# Patient Record
Sex: FEMALE | Race: WHITE | NOT HISPANIC OR LATINO | Employment: OTHER | URBAN - METROPOLITAN AREA
[De-identification: names, ages, dates, MRNs, and addresses within clinical notes are randomized per-mention and may not be internally consistent; named-entity substitution may affect disease eponyms.]

---

## 2017-02-07 ENCOUNTER — TELEPHONE (OUTPATIENT)
Dept: FAMILY MEDICINE | Facility: CLINIC | Age: 62
End: 2017-02-07

## 2017-02-07 NOTE — TELEPHONE ENCOUNTER
Panel Management Review      Patient has the following on her problem list:     Hypertension   Last three blood pressure readings:  BP Readings from Last 3 Encounters:   09/20/16 156/90   09/13/16 164/80   09/13/16 152/87     Blood pressure: Failed    HTN Guidelines:  Age 18-59 BP range:  Less than 140/90  Age 60-85 with Diabetes:  Less than 140/90  Age 60-85 without Diabetes:  less than 150/90      Composite cancer screening  Chart review shows that this patient is due/due soon for the following None  Summary:    Patient is due/failing the following:   BP CHECK    Action needed:   Patient needs office visit for BP follow up with PCP.    Type of outreach:    Phone, spoke to patient.  appt scheduled    Questions for provider review:    None                                                                                                                                    Amber Goodson CMA       Chart routed to Care Team .

## 2017-03-01 ENCOUNTER — HOSPITAL ENCOUNTER (OUTPATIENT)
Dept: MAMMOGRAPHY | Facility: CLINIC | Age: 62
Discharge: HOME | End: 2017-03-01
Attending: OBSTETRICS & GYNECOLOGY | Admitting: OBSTETRICS & GYNECOLOGY
Payer: COMMERCIAL

## 2017-03-01 ENCOUNTER — OFFICE VISIT (OUTPATIENT)
Dept: FAMILY MEDICINE | Facility: CLINIC | Age: 62
End: 2017-03-01
Payer: COMMERCIAL

## 2017-03-01 VITALS
HEIGHT: 63 IN | SYSTOLIC BLOOD PRESSURE: 156 MMHG | DIASTOLIC BLOOD PRESSURE: 88 MMHG | TEMPERATURE: 98.8 F | BODY MASS INDEX: 32.5 KG/M2 | OXYGEN SATURATION: 98 % | HEART RATE: 89 BPM | WEIGHT: 183.4 LBS | RESPIRATION RATE: 20 BRPM

## 2017-03-01 DIAGNOSIS — L30.9 ECZEMA, UNSPECIFIED TYPE: ICD-10-CM

## 2017-03-01 DIAGNOSIS — Z12.31 VISIT FOR SCREENING MAMMOGRAM: ICD-10-CM

## 2017-03-01 DIAGNOSIS — L91.8 SKIN TAG: ICD-10-CM

## 2017-03-01 DIAGNOSIS — Z00.00 ROUTINE GENERAL MEDICAL EXAMINATION AT A HEALTH CARE FACILITY: Primary | ICD-10-CM

## 2017-03-01 DIAGNOSIS — Z23 NEED FOR PROPHYLACTIC VACCINATION AND INOCULATION AGAINST INFLUENZA: ICD-10-CM

## 2017-03-01 DIAGNOSIS — I10 BENIGN ESSENTIAL HYPERTENSION: ICD-10-CM

## 2017-03-01 LAB
CHOLEST SERPL-MCNC: 216 MG/DL
GLUCOSE SERPL-MCNC: 106 MG/DL (ref 70–99)
HCV AB SERPL QL IA: NORMAL
HDLC SERPL-MCNC: 58 MG/DL
LDLC SERPL CALC-MCNC: 127 MG/DL
NONHDLC SERPL-MCNC: 158 MG/DL
TRIGL SERPL-MCNC: 154 MG/DL

## 2017-03-01 PROCEDURE — 90471 IMMUNIZATION ADMIN: CPT | Performed by: FAMILY MEDICINE

## 2017-03-01 PROCEDURE — 99396 PREV VISIT EST AGE 40-64: CPT | Performed by: FAMILY MEDICINE

## 2017-03-01 PROCEDURE — 82947 ASSAY GLUCOSE BLOOD QUANT: CPT | Performed by: FAMILY MEDICINE

## 2017-03-01 PROCEDURE — 90686 IIV4 VACC NO PRSV 0.5 ML IM: CPT | Performed by: FAMILY MEDICINE

## 2017-03-01 PROCEDURE — 99213 OFFICE O/P EST LOW 20 MIN: CPT | Mod: 25 | Performed by: FAMILY MEDICINE

## 2017-03-01 PROCEDURE — 86803 HEPATITIS C AB TEST: CPT | Performed by: FAMILY MEDICINE

## 2017-03-01 PROCEDURE — G0202 SCR MAMMO BI INCL CAD: HCPCS

## 2017-03-01 PROCEDURE — 80061 LIPID PANEL: CPT | Performed by: FAMILY MEDICINE

## 2017-03-01 PROCEDURE — 36415 COLL VENOUS BLD VENIPUNCTURE: CPT | Performed by: FAMILY MEDICINE

## 2017-03-01 RX ORDER — TRIAMCINOLONE ACETONIDE 1 MG/G
OINTMENT TOPICAL
Qty: 80 G | Refills: 0 | Status: SHIPPED | OUTPATIENT
Start: 2017-03-01 | End: 2017-05-24

## 2017-03-01 ASSESSMENT — PAIN SCALES - GENERAL: PAINLEVEL: NO PAIN (0)

## 2017-03-01 NOTE — PROGRESS NOTES
Amber Please inform Julia/ or caretaker  that this result(s) is/are normal.  Thanks. SHEYLA Salinas MD

## 2017-03-01 NOTE — PROGRESS NOTES
Injectable Influenza Immunization Documentation    1.  Is the person to be vaccinated sick today?  No    2. Does the person to be vaccinated have an allergy to eggs or to a component of the vaccine?  No    3. Has the person to be vaccinated today ever had a serious reaction to influenza vaccine in the past?  No    4. Has the person to be vaccinated ever had Guillain-Huron syndrome?  No     Form completed by self  GABRIELE Lane

## 2017-03-01 NOTE — MR AVS SNAPSHOT
After Visit Summary   3/1/2017    Julia Jacobson    MRN: [id number]           Patient Information     Date Of Birth          1955        Visit Information        Provider Department      3/1/2017 9:00 AM Valentin Ham MD [facility]        Today's Diagnoses     Routine general medical examination at a health care facility    -  1    Need for prophylactic vaccination and inoculation against influenza        Eczema, unspecified type        Benign essential hypertension        Skin tag          Care Instructions      Preventive Health Recommendations  Female Ages 50 - 64    Yearly exam: See your health care provider every year in order to  o Review health changes.   o Discuss preventive care.    o Review your medicines if your doctor has prescribed any.      Get a Pap test every three years (unless you have an abnormal result and your provider advises testing more often).    If you get Pap tests with HPV test, you only need to test every 5 years, unless you have an abnormal result.     You do not need a Pap test if your uterus was removed (hysterectomy) and you have not had cancer.    You should be tested each year for STDs (sexually transmitted diseases) if you're at risk.     Have a mammogram every 1 to 2 years.    Have a colonoscopy at age 50, or have a yearly FIT test (stool test). These exams screen for colon cancer.      Have a cholesterol test every 5 years, or more often if advised.    Have a diabetes test (fasting glucose) every three years. If you are at risk for diabetes, you should have this test more often.     If you are at risk for osteoporosis (brittle bone disease), think about having a bone density scan (DEXA).    Shots: Get a flu shot each year. Get a tetanus shot every 10 years.    Nutrition:     Eat at least 5 servings of fruits and vegetables each day.    Eat whole-grain bread, whole-wheat pasta and brown rice instead of white grains and rice.    Talk  "to your provider about Calcium and Vitamin D.     Lifestyle    Exercise at least 150 minutes a week (30 minutes a day, 5 days a week). This will help you control your weight and prevent disease.    Limit alcohol to one drink per day.    No smoking.     Wear sunscreen to prevent skin cancer.     See your dentist every six months for an exam and cleaning.    See your eye doctor every 1 to 2 years.          Follow-ups after your visit        Who to contact     If you have questions or need follow up information about today's clinic visit or your schedule please contact [facility] directly at [phone].  Normal or non-critical lab and imaging results will be communicated to you by PanTheryxhart, letter or phone within 4 business days after the clinic has received the results. If you do not hear from us within 7 days, please contact the clinic through [employer] or phone. If you have a critical or abnormal lab result, we will notify you by phone as soon as possible.  Submit refill requests through [employer] or call your pharmacy and they will forward the refill request to us. Please allow 3 business days for your refill to be completed.          Additional Information About Your Visit        MyChart Information     [employer] gives you secure access to your electronic health record. If you see a primary care provider, you can also send messages to your care team and make appointments. If you have questions, please call your primary care clinic.  If you do not have a primary care provider, please call [phone] and they will assist you.        Care EveryWhere ID     This is your Care EveryWhere ID. This could be used by other organizations to access your [place] medical records  [id number]        Your Vitals Were     Pulse Temperature Respirations Height Pulse Oximetry BMI (Body Mass Index)    89 98.8  F (37.1  C) (Temporal) 20 5' 2.99\" (1.6 m) 98% 32.5 kg/m2       Blood Pressure from Last 3 Encounters: "   03/01/17 156/88   09/20/16 156/90   09/13/16 164/80    Weight from Last 3 Encounters:   03/01/17 183 lb 6.4 oz (83.2 kg)   10/25/16 180 lb (81.6 kg)   09/13/16 181 lb (82.1 kg)              We Performed the Following     FLU VAC, SPLIT VIRUS IM > 3 YO (QUADRIVALENT) [84505]     GLUCOSE     Hepatitis C antibody     Lipid panel reflex to direct LDL     Vaccine Administration, Initial [48096]          Today's Medication Changes          These changes are accurate as of: 3/1/17 12:22 PM.  If you have any questions, ask your nurse or doctor.               Start taking these medicines.        Dose/Directions    triamcinolone 0.1 % ointment   Commonly known as:  KENALOG   Used for:  Eczema, unspecified type   Started by:  Valentin Ham MD        Apply sparingly to affected area three times daily for 14 days. Call if not improved. Use at night with cotton gloves.   Quantity:  80 g   Refills:  0            Where to get your medicines      These medications were sent to [facility] [address] [place] [id number]     Phone:  [phone]     triamcinolone 0.1 % ointment                Primary Care Provider Office Phone # Fax #    Maximino Paul Salinas [phone]       [place] [id number] [place] [address]        Thank you!     Thank you for choosing [facility]  for your care. Our goal is always to provide you with excellent care. Hearing back from our patients is one way we can continue to improve our services. Please take a few minutes to complete the written survey that you may receive in the mail after your visit with us. Thank you!             Your Updated Medication List - Protect others around you: Learn how to safely use, store and throw away your medicines at www.disposemymeds.org.          This list is accurate as of: 3/1/17 12:22 PM.  Always use your most recent med list.                    Brand Name Dispense Instructions for use    amLODIPine 2.5 MG tablet    NORVASC    30 tablet    Take 1 tablet (2.5 mg) by mouth daily       AZOPT 1 % ophthalmic susp   Generic drug:  brinzolamide          COMBIGAN 0.2-0.5 % ophthalmic solution   Generic drug:  brimonidine-timolol          prednisoLONE acetate 1 % ophthalmic susp    PRED FORTE         RESTASIS 0.05 % ophthalmic emulsion   Generic drug:  cycloSPORINE          triamcinolone 0.1 % ointment    KENALOG    80 g    Apply sparingly to affected area three times daily for 14 days. Call if not improved. Use at night with cotton gloves.       vitamin D 78132 UNIT capsule    ERGOCALCIFEROL     Reported on 3/1/2017

## 2017-03-01 NOTE — PROGRESS NOTES
SUBJECTIVE:     CC: Julia Jacobson is an 61 year old woman who presents for preventive health visit.     Healthy Habits:    Do you get at least three servings of calcium containing foods daily (dairy, green leafy vegetables, etc.)? Lactose, no supplements    Amount of exercise or daily activities, outside of work: 2-3 hour(s) per day    Problems taking medications regularly No    Medication side effects: No    Have you had an eye exam in the past two years? yes    Do you see a dentist twice per year? yes    Do you have sleep apnea, excessive snoring or daytime drowsiness?yes-cpap    HTN - 130-140s. Measuring at home, once monthly. toleratiung meds well.         Today's PHQ-2 Score:   PHQ-2 ( 1999 Pfizer) 3/1/2017 1/22/2016   Q1: Little interest or pleasure in doing things 0 0   Q2: Feeling down, depressed or hopeless 1 0   PHQ-2 Score 1 0       Abuse: Current or Past(Physical, Sexual or Emotional)- No  Do you feel safe in your environment - Yes    Social History   Substance Use Topics     Smoking status: Never Smoker     Smokeless tobacco: Not on file     Alcohol use 1.0 oz/week     The patient does not drink >3 drinks per day nor >7 drinks per week.    Recent Labs   Lab Test 04/24/14   CHOL  237*   HDL  38*   LDL  149*   TRIG  250*   NHDL  199*       Reviewed orders with patient.  Reviewed health maintenance and updated orders accordingly - Yes    Mammo Decision Support:  Patient over age 50, mutual decision to screen reflected in health maintenance.    Pertinent mammograms are reviewed under the imaging tab.  History of abnormal Pap smear: Status post benign hysterectomy. Health Maintenance and Surgical History updated.    Reviewed and updated as needed this visit by clinical staff  Tobacco  Allergies  Med Hx  Surg Hx  Fam Hx  Soc Hx        Reviewed and updated as needed this visit by Provider            ROS:  12 pt otherwise neg    Problem list, Medication list, Allergies, and Medical/Social/Surgical  "histories reviewed in EPIC and updated as appropriate.  OBJECTIVE:     /88 (BP Location: Left arm, Patient Position: Chair, Cuff Size: Adult Regular)  Pulse 89  Temp 98.8  F (37.1  C) (Temporal)  Resp 20  Ht 5' 2.99\" (1.6 m)  Wt 183 lb 6.4 oz (83.2 kg)  SpO2 98%  BMI 32.5 kg/m2  EXAM:  GENERAL: healthy, alert and no distress  NECK: no adenopathy, no asymmetry, masses, or scars and thyroid normal to palpation  RESP: lungs clear to auscultation - no rales, rhonchi or wheezes  CV: regular rate and rhythm, normal S1 S2, no S3 or S4, no murmur, click or rub, no peripheral edema and peripheral pulses strong  ABDOMEN: soft, nontender, no hepatosplenomegaly, no masses and bowel sounds normal  MS: no gross musculoskeletal defects noted, no edema  SKIN: no suspicious lesions or rashes. Small skin tag 1 cm on the mounds pubis. The right hand overlying the fourth and fifth MCPs has a raised erythematous patch with scale and cracking  NEURO: Normal strength and tone, mentation intact and speech normal  PSYCH: mentation appears normal, affect normal/bright    Procedure-after informed consent, prepped the skin with chlorhexidine and then injected one mL of 1% lidocaine with epinephrine. With good anesthesia snipped the 1 cm skin tag with scissors. Tolerated well.  ASSESSMENT/PLAN:         ICD-10-CM    1. Routine general medical examination at a health care facility Z00.00 GLUCOSE     Lipid panel reflex to direct LDL     Hepatitis C antibody   2. Need for prophylactic vaccination and inoculation against influenza Z23 FLU VAC, SPLIT VIRUS IM > 3 YO (QUADRIVALENT) [78586]     Vaccine Administration, Initial [85462]   3. Eczema, unspecified type L30.9 triamcinolone (KENALOG) 0.1 % ointment   4. Benign essential hypertension I10    5. Skin tag L91.8      Blood pressure uncontrolled. Start more frequent home monitoring. Call back in 2 weeks with numbers to decide if her amlodipine should be increased. Kenalog ointment for her " "eczema on her right hand. Also discussed emollients. Other routine annual topics discussed.    COUNSELING:   Reviewed preventive health counseling, as reflected in patient instructions         reports that she has never smoked. She does not have any smokeless tobacco history on file.    Estimated body mass index is 32.5 kg/(m^2) as calculated from the following:    Height as of this encounter: 5' 2.99\" (1.6 m).    Weight as of this encounter: 183 lb 6.4 oz (83.2 kg).       Counseling Resources:  ATP IV Guidelines  Pooled Cohorts Equation Calculator  Breast Cancer Risk Calculator  FRAX Risk Assessment  ICSI Preventive Guidelines  Dietary Guidelines for Americans, 2010  USDA's MyPlate  ASA Prophylaxis  Lung CA Screening    Valentin Ham MD  [facility]  "

## 2017-05-17 ENCOUNTER — TELEPHONE (OUTPATIENT)
Dept: FAMILY MEDICINE | Facility: CLINIC | Age: 62
End: 2017-05-17

## 2017-05-17 NOTE — TELEPHONE ENCOUNTER
Panel Management Review      Patient has the following on her problem list:     Hypertension   Last three blood pressure readings:  BP Readings from Last 3 Encounters:   03/01/17 156/88   09/20/16 156/90   09/13/16 164/80     Blood pressure: FAILED    HTN Guidelines:  Age 18-59 BP range:  Less than 140/90  Age 60-85 with Diabetes:  Less than 140/90  Age 60-85 without Diabetes:  less than 150/90      Composite cancer screening  Chart review shows that this patient is due/due soon for the following None  Summary:    Patient is due/failing the following:   BP CHECK    Action needed:   Patient needs nurse only appointment.    Type of outreach:    Phone, spoke to patient.  appt scheduled    Questions for provider review:    None                                                                                                                                    Amber Goodson CMA       Chart routed to Care Team .

## 2017-05-22 ENCOUNTER — ALLIED HEALTH/NURSE VISIT (OUTPATIENT)
Dept: FAMILY MEDICINE | Facility: CLINIC | Age: 62
End: 2017-05-22
Payer: COMMERCIAL

## 2017-05-22 VITALS — DIASTOLIC BLOOD PRESSURE: 80 MMHG | SYSTOLIC BLOOD PRESSURE: 138 MMHG

## 2017-05-22 DIAGNOSIS — I10 BENIGN ESSENTIAL HYPERTENSION: Primary | ICD-10-CM

## 2017-05-22 PROCEDURE — 99207 ZZC NO CHARGE NURSE ONLY: CPT

## 2017-05-22 NOTE — MR AVS SNAPSHOT
After Visit Summary   5/22/2017    Julia Jacobson    MRN: [id number]           Patient Information     Date Of Birth          1955        Visit Information        Provider Department      5/22/2017 9:30 AM NL FLOAT TEAM D [facility]        Today's Diagnoses     Benign essential hypertension    -  1       Follow-ups after your visit        Your next 10 appointments already scheduled     May 22, 2017  9:30 AM CDT   Nurse Only with NL CLAY TEAM BARBARA [facility] ([facility])    [address] [place] [id number] [phone]              Who to contact     If you have questions or need follow up information about today's clinic visit or your schedule please contact [facility] directly at [phone].  Normal or non-critical lab and imaging results will be communicated to you by MyChart, letter or phone within 4 business days after the clinic has received the results. If you do not hear from us within 7 days, please contact the clinic through MyChart or phone. If you have a critical or abnormal lab result, we will notify you by phone as soon as possible.  Submit refill requests through [employer] or call your pharmacy and they will forward the refill request to us. Please allow 3 business days for your refill to be completed.          Additional Information About Your Visit        MyChart Information     [employer] gives you secure access to your electronic health record. If you see a primary care provider, you can also send messages to your care team and make appointments. If you have questions, please call your primary care clinic.  If you do not have a primary care provider, please call [phone] and they will assist you.        Care EveryWhere ID     This is your Care EveryWhere ID. This could be used by other organizations to access your [place] medical records  [id number]         Blood Pressure from Last 3  Encounters:   05/22/17 138/80   03/01/17 156/88   09/20/16 156/90    Weight from Last 3 Encounters:   03/01/17 183 lb 6.4 oz (83.2 kg)   10/25/16 180 lb (81.6 kg)   09/13/16 181 lb (82.1 kg)              Today, you had the following     No orders found for display       Primary Care Provider Office Phone # Fax #    Maximino Salinas [phone]       [facility] [address] [place] [address]        Thank you!     Thank you for choosing [facility]  for your care. Our goal is always to provide you with excellent care. Hearing back from our patients is one way we can continue to improve our services. Please take a few minutes to complete the written survey that you may receive in the mail after your visit with us. Thank you!             Your Updated Medication List - Protect others around you: Learn how to safely use, store and throw away your medicines at www.disposemymeds.org.          This list is accurate as of: 5/22/17  9:28 AM.  Always use your most recent med list.                   Brand Name Dispense Instructions for use    amLODIPine 2.5 MG tablet    NORVASC    30 tablet    Take 1 tablet (2.5 mg) by mouth daily       AZOPT 1 % ophthalmic susp   Generic drug:  brinzolamide          COMBIGAN 0.2-0.5 % ophthalmic solution   Generic drug:  brimonidine-timolol          prednisoLONE acetate 1 % ophthalmic susp    PRED FORTE         RESTASIS 0.05 % ophthalmic emulsion   Generic drug:  cycloSPORINE          triamcinolone 0.1 % ointment    KENALOG    80 g    Apply sparingly to affected area three times daily for 14 days. Call if not improved. Use at night with cotton gloves.       vitamin D 96583 UNIT capsule    ERGOCALCIFEROL     Reported on 3/1/2017

## 2017-05-24 DIAGNOSIS — L30.9 ECZEMA, UNSPECIFIED TYPE: ICD-10-CM

## 2017-05-24 NOTE — TELEPHONE ENCOUNTER
Triamcinolone       Last Written Prescription Date: 3/1/17  Last Fill Quantity: 80g,  # refills: 0   Last Office Visit with G, P or [facility] prescribing provider: 3/1/17                                             Beth Singh MA 5/24/2017

## 2017-06-16 RX ORDER — TRIAMCINOLONE ACETONIDE 1 MG/G
OINTMENT TOPICAL
Qty: 80 G | Refills: 0 | Status: SHIPPED | OUTPATIENT
Start: 2017-06-16

## 2017-11-20 DIAGNOSIS — I10 BENIGN ESSENTIAL HYPERTENSION: ICD-10-CM

## 2017-11-20 RX ORDER — AMLODIPINE BESYLATE 5 MG/1
5 TABLET ORAL DAILY
Qty: 30 TABLET | Refills: 3 | Status: SHIPPED | OUTPATIENT
Start: 2017-11-20 | End: 2018-04-24

## 2017-11-20 NOTE — TELEPHONE ENCOUNTER
Amlodipine 5mg     Last Written Prescription Date:  9/20/16  Last Fill Quantity: 90,   # refills: 3  Future Office visit:    Next 5 appointments (look out 90 days)     Nov 21, 2017 10:00 AM CST   [facility] G[facility]MAN [facility]    [address] [place] [address]   [phone]                   Routing refill request to provider for review/approval because:  Drug not active on patient's medication list

## 2017-12-27 ENCOUNTER — TELEPHONE (OUTPATIENT)
Dept: SLEEP MEDICINE | Facility: CLINIC | Age: 62
End: 2017-12-27

## 2017-12-27 DIAGNOSIS — G47.33 OSA (OBSTRUCTIVE SLEEP APNEA): Primary | ICD-10-CM

## 2018-04-24 DIAGNOSIS — I10 BENIGN ESSENTIAL HYPERTENSION: ICD-10-CM

## 2018-04-24 RX ORDER — AMLODIPINE BESYLATE 5 MG/1
5 TABLET ORAL DAILY
Qty: 30 TABLET | Refills: 0 | Status: SHIPPED | OUTPATIENT
Start: 2018-04-24 | End: 2021-12-21

## 2018-04-24 NOTE — TELEPHONE ENCOUNTER
"Requested Prescriptions   Pending Prescriptions Disp Refills     amLODIPine (NORVASC) 5 MG tablet 30 tablet 3     Sig: Take 1 tablet (5 mg) by mouth daily    Calcium Channel Blockers Protocol  Failed    4/24/2018  1:19 PM       Failed - Recent (12 mo) or future (30 days) visit within the authorizing provider's specialty    Patient had office visit in the last 12 months or has a visit in the next 30 days with authorizing provider or within the authorizing provider's specialty.  See \"Patient Info\" tab in inbasket, or \"Choose Columns\" in Meds & Orders section of the refill encounter.           Failed - Normal serum creatinine on file in past 12 months    No lab results found.         Passed - Blood pressure under 140/90 in past 12 months    BP Readings from Last 3 Encounters:   05/22/17 138/80   03/01/17 156/88   09/20/16 156/90                Passed - Patient is age 18 or older       Passed - No active pregnancy on record       Passed - No positive pregnancy test in past 12 months          Last Written Prescription Date:  11/20/17  Last Fill Quantity: 30,  # refills: 3   Last Office Visit with [facility], [facility] or [facility] prescribing provider:  3/1/17   Future Office Visit:       "

## 2019-10-03 ENCOUNTER — OFFICE VISIT (OUTPATIENT)
Dept: SLEEP MEDICINE | Facility: CLINIC | Age: 64
End: 2019-10-03
Payer: COMMERCIAL

## 2019-10-03 VITALS
DIASTOLIC BLOOD PRESSURE: 90 MMHG | BODY MASS INDEX: 32.49 KG/M2 | OXYGEN SATURATION: 99 % | SYSTOLIC BLOOD PRESSURE: 180 MMHG | HEIGHT: 63 IN | HEART RATE: 75 BPM

## 2019-10-03 DIAGNOSIS — G47.33 OSA (OBSTRUCTIVE SLEEP APNEA): Primary | ICD-10-CM

## 2019-10-03 PROCEDURE — 99201 ZZC OFFICE/OUTPT VISIT, NEW, LEVEL I: CPT | Performed by: OTOLARYNGOLOGY

## 2019-10-03 RX ORDER — VALSARTAN 80 MG/1
80 TABLET ORAL DAILY
Refills: 1 | COMMUNITY
Start: 2019-07-12

## 2019-10-03 RX ORDER — DORZOLAMIDE HYDROCHLORIDE AND TIMOLOL MALEATE 20; 5 MG/ML; MG/ML
1 SOLUTION/ DROPS OPHTHALMIC
COMMUNITY
Start: 2019-07-14

## 2019-10-03 NOTE — PROGRESS NOTES
Obstructive Sleep Apnea - PAP Follow-Up Visit:    Chief Complaint   Patient presents with     CPAP Follow Up       Julia Jacobson comes in today for follow-up of their severe sleep apnea, managed with CPAP.     No specialty comments available.    Overall, she rates the experience with PAP as 10 (0 poor, 10 great). The mask is comfortable.  The mask is not leaking .  She is not snoring with the mask on. She is not having gasp arousals.  She is not having significant oral/nasal dryness. The pressure is comfortable. But the patient is getting esophageal irritation for swallowing air and may be interested in nasal interface.    Her PAP interface is Full Face Mask.    . The patient is usually getting 8 hours of sleep per night.    She does feel rested in the morning.    O[place]ley Sleepiness Scale: 5/24      No data recorded    ResMed       Auto-PAP 4.0 - 8.0 cmH2O 30 day usage data:    100% of days with > 4 hours of use. 0/30 days with no use.   Average use 484 minutes per day.   95%ile Leak 1.43 L/min.   CPAP 95% pressure 7.9 cm.   AHI 0.85 events per hour.         Past medical/surgical history, family history, social history, medications and allergies were reviewed.      Problem List:  Patient Active Problem List    Diagnosis Date Noted     Benign essential hypertension 03/01/2017     Priority: Medium     Eczema, unspecified type 03/01/2017     Priority: Medium     Radial styloid tenosynovitis of right hand 10/25/2016     Priority: Medium     Ulnar nerve compression, left 10/25/2016     Priority: Medium     Ulnar nerve compression, right 10/04/2016     Priority: Medium     Cataract 01/22/2016     Priority: Medium     Keratoconus 02/11/2003     Priority: Medium     Problem list name updated by automated process. Provider to review       Allergic rhinitis 03/13/2002     Priority: Medium     Problem list name updated by automated process. Provider to review       Glaucoma      Priority: Medium     Problem list name  updated by automated process. Provider to review          There were no vitals taken for this visit.    Impression/Plan:  The patient with severe AMANDA optimally titarted. However, is interested in nasal interphase to decreased air swallowing.     Severe Sleep apnea.  Tolerating PAP well. Daytime symptoms are improved.   Will try nasal pillows.    Julia Jacobson will follow up in about 2 month(s).     Fifteen minutes spent with patient, all of which were spent face-to-face counseling, consulting, coordinating plan of care.      CC:  Maximino Salinas Oleg Froymovich, MD

## 2019-10-03 NOTE — NURSING NOTE
Weight management plan: Patient was referred to their PCP to discuss a diet and exercise plan.     Julia to follow up with Primary Care provider regarding elevated blood pressure.

## 2019-10-11 ENCOUNTER — DOCUMENTATION ONLY (OUTPATIENT)
Dept: SLEEP MEDICINE | Facility: CLINIC | Age: 64
End: 2019-10-11
Payer: COMMERCIAL

## 2019-10-11 DIAGNOSIS — G47.33 OSA (OBSTRUCTIVE SLEEP APNEA): Primary | ICD-10-CM

## 2019-10-11 NOTE — PROGRESS NOTES
Patient came to [facility] for mask fitting appointment on October 11, 2019. Patient requested to switch masks because poor seal/leak. Patient tried on the following masks: WISP AND NUANCE GEL PRO Patient selected a Melody RespirFoxflys Mask name: DARRELL Nasal mask size Small

## 2019-12-08 ENCOUNTER — MYC MEDICAL ADVICE (OUTPATIENT)
Dept: FAMILY MEDICINE | Facility: CLINIC | Age: 64
End: 2019-12-08

## 2019-12-08 ENCOUNTER — HEALTH MAINTENANCE LETTER (OUTPATIENT)
Age: 64
End: 2019-12-08

## 2019-12-09 NOTE — TELEPHONE ENCOUNTER
Patient messaging that she no longer is seeking any of her health needs within [facility] except for [facility]. She asks that we note this and do not call her with any Health Maintenance that is due. Rosita Caldera LPN

## 2020-03-15 ENCOUNTER — HEALTH MAINTENANCE LETTER (OUTPATIENT)
Age: 65
End: 2020-03-15

## 2021-01-09 ENCOUNTER — HEALTH MAINTENANCE LETTER (OUTPATIENT)
Age: 66
End: 2021-01-09

## 2021-01-27 ENCOUNTER — TELEPHONE (OUTPATIENT)
Dept: SLEEP MEDICINE | Facility: CLINIC | Age: 66
End: 2021-01-27

## 2021-01-27 DIAGNOSIS — G47.33 OBSTRUCTIVE SLEEP APNEA: Primary | ICD-10-CM

## 2021-01-27 DIAGNOSIS — G47.33 OSA (OBSTRUCTIVE SLEEP APNEA): Primary | ICD-10-CM

## 2021-01-27 NOTE — TELEPHONE ENCOUNTER
The attachment to patient's mask is broken and also  Patient is needing new mask and head gear. Please send the new order to [facility] medical.  If there is any questions please call patient.  Jose M Sena,  For 1st Floor Primary Care

## 2021-01-27 NOTE — TELEPHONE ENCOUNTER
Patient was informed order was placed and will call [facility] for supplies.  All questions were answered.  Amanda Bruce RN on 1/27/2021 at 2:09 PM

## 2021-01-27 NOTE — TELEPHONE ENCOUNTER
Orders for new supplies have been written.  Patient is to contact [facility] so that they can send everything to him.  Hussein Cruz MD

## 2021-05-08 ENCOUNTER — HEALTH MAINTENANCE LETTER (OUTPATIENT)
Age: 66
End: 2021-05-08

## 2021-10-23 ENCOUNTER — HEALTH MAINTENANCE LETTER (OUTPATIENT)
Age: 66
End: 2021-10-23

## 2021-12-21 ENCOUNTER — APPOINTMENT (OUTPATIENT)
Dept: CT IMAGING | Facility: CLINIC | Age: 66
End: 2021-12-21
Attending: FAMILY MEDICINE
Payer: COMMERCIAL

## 2021-12-21 ENCOUNTER — HOSPITAL ENCOUNTER (EMERGENCY)
Facility: CLINIC | Age: 66
Discharge: HOME | End: 2021-12-21
Attending: FAMILY MEDICINE | Admitting: FAMILY MEDICINE
Payer: COMMERCIAL

## 2021-12-21 ENCOUNTER — APPOINTMENT (OUTPATIENT)
Dept: MRI IMAGING | Facility: CLINIC | Age: 66
End: 2021-12-21
Attending: FAMILY MEDICINE
Payer: COMMERCIAL

## 2021-12-21 VITALS
OXYGEN SATURATION: 97 % | BODY MASS INDEX: 34.72 KG/M2 | SYSTOLIC BLOOD PRESSURE: 181 MMHG | RESPIRATION RATE: 18 BRPM | DIASTOLIC BLOOD PRESSURE: 88 MMHG | HEART RATE: 76 BPM | TEMPERATURE: 97 F | WEIGHT: 196 LBS

## 2021-12-21 DIAGNOSIS — R47.01 EXPRESSIVE APHASIA: ICD-10-CM

## 2021-12-21 DIAGNOSIS — I10 MALIGNANT ESSENTIAL HYPERTENSION: ICD-10-CM

## 2021-12-21 LAB
ALBUMIN UR-MCNC: NEGATIVE MG/DL
APPEARANCE UR: CLEAR
BILIRUB UR QL STRIP: NEGATIVE
COLOR UR AUTO: COLORLESS
CRP SERPL-MCNC: <2.9 MG/L (ref 0–8)
ETHANOL SERPL-MCNC: <0.01 G/DL
GLUCOSE BLDC GLUCOMTR-MCNC: 168 MG/DL (ref 70–99)
GLUCOSE UR STRIP-MCNC: NEGATIVE MG/DL
HGB UR QL STRIP: NEGATIVE
HOLD SPECIMEN: NORMAL
HOLD SPECIMEN: NORMAL
KETONES UR STRIP-MCNC: NEGATIVE MG/DL
LEUKOCYTE ESTERASE UR QL STRIP: NEGATIVE
NITRATE UR QL: NEGATIVE
PH UR STRIP: 7 [PH] (ref 5–7)
RBC URINE: <1 /HPF
SP GR UR STRIP: 1.03 (ref 1–1.03)
SQUAMOUS EPITHELIAL: <1 /HPF
TSH SERPL DL<=0.005 MIU/L-ACNC: 2.2 MU/L (ref 0.4–4)
UROBILINOGEN UR STRIP-MCNC: NORMAL MG/DL
WBC URINE: 0 /HPF

## 2021-12-21 PROCEDURE — 82077 ASSAY SPEC XCP UR&BREATH IA: CPT | Performed by: FAMILY MEDICINE

## 2021-12-21 PROCEDURE — 250N000013 HC RX MED GY IP 250 OP 250 PS 637: Performed by: FAMILY MEDICINE

## 2021-12-21 PROCEDURE — 84443 ASSAY THYROID STIM HORMONE: CPT | Performed by: FAMILY MEDICINE

## 2021-12-21 PROCEDURE — 96374 THER/PROPH/DIAG INJ IV PUSH: CPT | Mod: 59 | Performed by: FAMILY MEDICINE

## 2021-12-21 PROCEDURE — 93005 ELECTROCARDIOGRAM TRACING: CPT | Performed by: FAMILY MEDICINE

## 2021-12-21 PROCEDURE — 86140 C-REACTIVE PROTEIN: CPT | Performed by: FAMILY MEDICINE

## 2021-12-21 PROCEDURE — 250N000011 HC RX IP 250 OP 636: Performed by: FAMILY MEDICINE

## 2021-12-21 PROCEDURE — 81001 URINALYSIS AUTO W/SCOPE: CPT | Performed by: FAMILY MEDICINE

## 2021-12-21 PROCEDURE — 99285 EMERGENCY DEPT VISIT HI MDM: CPT | Mod: 25 | Performed by: FAMILY MEDICINE

## 2021-12-21 PROCEDURE — 70553 MRI BRAIN STEM W/O & W/DYE: CPT

## 2021-12-21 PROCEDURE — 250N000009 HC RX 250: Performed by: FAMILY MEDICINE

## 2021-12-21 PROCEDURE — 70450 CT HEAD/BRAIN W/O DYE: CPT | Mod: XS

## 2021-12-21 PROCEDURE — 70496 CT ANGIOGRAPHY HEAD: CPT

## 2021-12-21 PROCEDURE — 255N000002 HC RX 255 OP 636: Performed by: FAMILY MEDICINE

## 2021-12-21 PROCEDURE — 36415 COLL VENOUS BLD VENIPUNCTURE: CPT | Performed by: FAMILY MEDICINE

## 2021-12-21 PROCEDURE — 93010 ELECTROCARDIOGRAM REPORT: CPT | Performed by: FAMILY MEDICINE

## 2021-12-21 PROCEDURE — A9585 GADOBUTROL INJECTION: HCPCS | Performed by: FAMILY MEDICINE

## 2021-12-21 RX ORDER — AMLODIPINE BESYLATE 5 MG/1
5 TABLET ORAL ONCE
Status: COMPLETED | OUTPATIENT
Start: 2021-12-21 | End: 2021-12-21

## 2021-12-21 RX ORDER — HYDRALAZINE HYDROCHLORIDE 20 MG/ML
10 INJECTION INTRAMUSCULAR; INTRAVENOUS EVERY 10 MIN PRN
Status: DISCONTINUED | OUTPATIENT
Start: 2021-12-21 | End: 2021-12-22 | Stop reason: HOSPADM

## 2021-12-21 RX ORDER — ASPIRIN 325 MG
325 TABLET ORAL ONCE
Status: COMPLETED | OUTPATIENT
Start: 2021-12-21 | End: 2021-12-21

## 2021-12-21 RX ORDER — SODIUM CHLORIDE 9 MG/ML
INJECTION, SOLUTION INTRAVENOUS CONTINUOUS PRN
Status: DISCONTINUED | OUTPATIENT
Start: 2021-12-21 | End: 2021-12-22 | Stop reason: HOSPADM

## 2021-12-21 RX ORDER — LABETALOL HYDROCHLORIDE 5 MG/ML
10 INJECTION, SOLUTION INTRAVENOUS EVERY 10 MIN PRN
Status: DISCONTINUED | OUTPATIENT
Start: 2021-12-21 | End: 2021-12-22 | Stop reason: HOSPADM

## 2021-12-21 RX ORDER — IOPAMIDOL 755 MG/ML
500 INJECTION, SOLUTION INTRAVASCULAR ONCE
Status: COMPLETED | OUTPATIENT
Start: 2021-12-21 | End: 2021-12-21

## 2021-12-21 RX ORDER — LORAZEPAM 2 MG/ML
0.75 INJECTION INTRAMUSCULAR ONCE
Status: COMPLETED | OUTPATIENT
Start: 2021-12-21 | End: 2021-12-21

## 2021-12-21 RX ORDER — GADOBUTROL 604.72 MG/ML
10 INJECTION INTRAVENOUS ONCE
Status: COMPLETED | OUTPATIENT
Start: 2021-12-21 | End: 2021-12-21

## 2021-12-21 RX ORDER — VALSARTAN 80 MG/1
80 TABLET ORAL DAILY
Status: DISCONTINUED | OUTPATIENT
Start: 2021-12-21 | End: 2021-12-22 | Stop reason: HOSPADM

## 2021-12-21 RX ORDER — AMLODIPINE BESYLATE 5 MG/1
5 TABLET ORAL DAILY
Qty: 30 TABLET | Refills: 0 | Status: SHIPPED | OUTPATIENT
Start: 2021-12-21 | End: 2023-09-19

## 2021-12-21 RX ADMIN — SODIUM CHLORIDE 100 ML: 9 INJECTION, SOLUTION INTRAVENOUS at 18:48

## 2021-12-21 RX ADMIN — GADOBUTROL 9 ML: 604.72 INJECTION INTRAVENOUS at 20:21

## 2021-12-21 RX ADMIN — AMLODIPINE BESYLATE 5 MG: 5 TABLET ORAL at 19:39

## 2021-12-21 RX ADMIN — LORAZEPAM 0.75 MG: 2 INJECTION INTRAMUSCULAR; INTRAVENOUS at 19:44

## 2021-12-21 RX ADMIN — VALSARTAN 80 MG: 80 TABLET, FILM COATED ORAL at 19:39

## 2021-12-21 RX ADMIN — ASPIRIN 325 MG ORAL TABLET 325 MG: 325 PILL ORAL at 19:39

## 2021-12-21 RX ADMIN — IOPAMIDOL 80 ML: 755 INJECTION, SOLUTION INTRAVENOUS at 18:49

## 2021-12-21 ASSESSMENT — ENCOUNTER SYMPTOMS
ENDOCRINE NEGATIVE: 1
CHILLS: 0
NERVOUS/ANXIOUS: 1
RESPIRATORY NEGATIVE: 1
HEMATOLOGIC/LYMPHATIC NEGATIVE: 1
SPEECH DIFFICULTY: 1
MUSCULOSKELETAL NEGATIVE: 1
FEVER: 0
ACTIVITY CHANGE: 1
GASTROINTESTINAL NEGATIVE: 1
APPETITE CHANGE: 0
EYES NEGATIVE: 1

## 2021-12-22 NOTE — ED PROVIDER NOTES
History     Chief Complaint   Patient presents with     Stroke Symptoms     HPI  Julia Jacobson is a 66 year old female who presents to the ER with her  via private motor vehicle with concerns about inability to finish a sentence or get her words out.  Patient's  stated that it started about an hour and a half ago.  Patient denies any pain issues.  Nursing staff stated that when she first came into the emergency room she would not speak but by the time she got back to room 5 the patient was able to converse with me.  Patient denied any fall, injury or fever.  She denied any cough illness or fever.  Her  states that she has been feeling her normal self until this episode happened.  They are not aware of any prior similar symptoms in the past.  Patient does have a history of hypertension for which he takes medication for.      Allergies:  Allergies   Allergen Reactions     Lisinopril Cough     Violent cough       Problem List:    Patient Active Problem List    Diagnosis Date Noted     Benign essential hypertension 03/01/2017     Priority: Medium     Eczema, unspecified type 03/01/2017     Priority: Medium     Radial styloid tenosynovitis of right hand 10/25/2016     Priority: Medium     Ulnar nerve compression, left 10/25/2016     Priority: Medium     Ulnar nerve compression, right 10/04/2016     Priority: Medium     Cataract 01/22/2016     Priority: Medium     Keratoconus 02/11/2003     Priority: Medium     Problem list name updated by automated process. Provider to review       Allergic rhinitis 03/13/2002     Priority: Medium     Problem list name updated by automated process. Provider to review       Glaucoma      Priority: Medium     Problem list name updated by automated process. Provider to review          Past Medical History:    Past Medical History:   Diagnosis Date     Carpal tunnel syndrome, right      HTN (hypertension)      Unspecified glaucoma(365.9)        Past Surgical  History:    Past Surgical History:   Procedure Laterality Date     C ANESTH,CORNEAL TRANSPLANT       C APPENDECTOMY  1965     C LIGATE FALLOPIAN TUBE  1991     C VAGINAL HYSTERECTOMY  03/2001    Hysterectomy, Vaginal       Family History:    Family History   Problem Relation Age of Onset     Cancer Mother         Cervical cancer, alive at 77     Hypertension Father      Heart Disease Father         MI x2 and quadriple bypass surgery     Lipids Father         Hypercholesterolemia     Cerebrovascular Disease Maternal Grandmother      Alcohol/Drug No family hx of      Allergies No family hx of      Diabetes No family hx of      Breast Cancer No family hx of        Social History:  Marital Status:   [2]  Social History     Tobacco Use     Smoking status: Never Smoker     Smokeless tobacco: Never Used   Substance Use Topics     Alcohol use: Yes     Alcohol/week: 1.7 standard drinks     Drug use: No        Medications:    amLODIPine (NORVASC) 5 MG tablet  AZOPT 1 % ophthalmic suspension  COMBIGAN 0.2-0.5 % ophthalmic solution  dorzolamide-timolol (COSOPT) 2-0.5 % ophthalmic solution  omeprazole (PRILOSEC) 20 MG DR capsule  prednisoLONE acetate (PRED FORTE) 1 % ophthalmic suspension  RESTASIS 0.05 % ophthalmic emulsion  triamcinolone (KENALOG) 0.1 % ointment  valsartan (DIOVAN) 80 MG tablet  vitamin D (ERGOCALCIFEROL) 06545 UNIT capsule  vitamin D3 (D3-50) 84475 units capsule          Review of Systems   Constitutional: Positive for activity change. Negative for appetite change, chills and fever.   HENT: Negative.    Eyes: Negative.    Respiratory: Negative.    Gastrointestinal: Negative.    Endocrine: Negative.    Genitourinary: Negative.    Musculoskeletal: Negative.    Skin: Negative.    Neurological: Positive for speech difficulty.   Hematological: Negative.    Psychiatric/Behavioral: The patient is nervous/anxious.    All other systems reviewed and are negative.      Physical Exam   BP: (!) 215/99  Pulse:  73  Temp: 97  F (36.1  C)  Resp: 16  Weight: 88.9 kg (196 lb)  SpO2: 97 %      Physical Exam  Vitals and nursing note reviewed. Exam conducted with a chaperone present.   Constitutional:       General: She is in acute distress.   HENT:      Head: Normocephalic and atraumatic.      Nose: Nose normal.      Mouth/Throat:      Mouth: Mucous membranes are moist.      Pharynx: No oropharyngeal exudate or posterior oropharyngeal erythema.   Eyes:      General: No scleral icterus.     Extraocular Movements: Extraocular movements intact.      Conjunctiva/sclera: Conjunctivae normal.      Pupils: Pupils are equal, round, and reactive to light.   Neck:      Vascular: No carotid bruit.   Cardiovascular:      Rate and Rhythm: Normal rate.      Pulses: Normal pulses.   Pulmonary:      Effort: Pulmonary effort is normal. No respiratory distress.   Abdominal:      Palpations: Abdomen is soft.      Tenderness: There is no abdominal tenderness.   Musculoskeletal:         General: Normal range of motion.      Cervical back: Neck supple.      Right lower leg: No edema.      Left lower leg: No edema.   Skin:     Capillary Refill: Capillary refill takes less than 2 seconds.      Findings: No rash.   Neurological:      Mental Status: She is alert and oriented to person, place, and time.      Comments: Patient has no obvious focal muscle weakness identified on exam.  She states that she can squeeze my fingers with her hands bilaterally but is able to lift her arms up in the air and hold them without tremor or drift.  Normal strength to lower extremities noted and patient able to ambulate from wheelchair to exam bed.  No evidence for any facial droop or tongue motion abnormality.  Pupils equal and reactive and patient denies diplopia.   Psychiatric:         Mood and Affect: Mood is anxious.         Speech: Speech is delayed.         Behavior: Behavior is cooperative.         Thought Content: Thought content normal.         ED Course                  Procedures              EKG Interpretation:      Interpreted by Sandro Angel DO  Time reviewed: 19:30  Symptoms at time of EKG: Speech difficulty   Rhythm: normal sinus   Rate: normal  Axis: normal  Ectopy: none  Conduction: normal  ST Segments/ T Waves: No ST-T wave changes  Q Waves: none  Comparison to prior: No old EKG available    Clinical Impression: normal EKG          Critical Care time:  none     The patient has stroke symptoms:         ED Stroke specific documentation           NIHSS PDF     Patient last known well time: 17:00  ED Provider first to bedside at: 18:39  CT Results received at: 19:16    Thrombolytics:   Not given due to minor/isolated/quickly resolving symptoms.    If treating with thrombolytics: Ensure SBP<180 and DBP<105 prior to treatment with thrombolytics.  Administering thrombolytics after treatment with IV labetalol, hydralazine, or nicardipine is reasonable once BP control is established.    Endovascular Retrieval:  Not initiated due to absence of proximal vessel occlusion    National Institutes of Health Stroke Scale (Baseline)  Time Performed: 18:40     Score    Level of consciousness: (0)   Alert, keenly responsive    LOC questions: (0)   Answers both questions correctly    LOC commands: (0)   Performs both tasks correctly    Best gaze: (0)   Normal    Visual: (0)   No visual loss    Facial palsy: (0)   Normal symmetrical movements    Motor arm (left): (0)   No drift    Motor arm (right): (0)   No drift    Motor leg (left): (0)   No drift    Motor leg (right): (0)   No drift    Limb ataxia: (0)   Absent    Sensory: (0)   Normal- no sensory loss    Best language: (1)   Mild to moderate aphasia    Dysarthria: (0)   Normal    Extinction and inattention: (0)   No abnormality        Total Score:  1        Stroke Mimics were considered (including migraine headache, seizure disorder, hypoglycemia (or hyperglycemia), head or spinal trauma, CNS infection, Toxin ingestion  and shock state (e.g. sepsis) .               Results for orders placed or performed during the hospital encounter of 12/21/21 (from the past 24 hour(s))   Glucose by meter   Result Value Ref Range    GLUCOSE BY METER POCT 168 (H) 70 - 99 mg/dL   CT Head w/o Contrast    Narrative    EXAM: CT HEAD W/O CONTRAST  LOCATION: [facility]  DATE/TIME: 12/21/2021 6:43 PM    INDICATION: Acute neurological deficit, stroke suspected.    COMPARISON: None.    TECHNIQUE: Routine CT head without IV contrast. Multiplanar reformats. Dose reduction techniques were used.    FINDINGS:  INTRACRANIAL CONTENTS: No intracranial hemorrhage, extra-axial collection, or mass effect. No CT evidence of acute infarct. Moderate presumed chronic small vessel ischemic changes. Multiple chronic bilateral basal ganglia and left thalamic lacunar   infarcts. Normal ventricles and sulci.     VISUALIZED ORBITS/SINUSES/MASTOIDS: Prior bilateral cataract surgery and glaucoma drainage device placement. Visualized portions of the orbits are otherwise unremarkable. No paranasal sinus mucosal disease. No middle ear or mastoid effusion.    BONES/SOFT TISSUES: No acute abnormality.      Impression    IMPRESSION:  1.  No CT evidence for acute intracranial process.  2.  Presumed chronic microvascular ischemic changes.     CTA Head Neck with Contrast    Narrative    EXAM: CTA  HEAD NECK WITH CONTRAST  LOCATION: [facility]  DATE/TIME: 12/21/2021 6:52 PM    INDICATION: Acute neuro deficit, stroke suspected.  COMPARISON: None.  CONTRAST: 80 mL Isovue 370.  TECHNIQUE: Head and neck CT angiogram with IV contrast. Noncontrast head CT followed by axial helical CT images of the head and neck vessels obtained during the arterial phase of intravenous contrast administration. Axial 2D reconstructed images and   multiplanar 3D MIP reconstructed images of the head and neck vessels were performed by the technologist.  Dose reduction techniques were used. All stenosis measurements made according to NASCET criteria unless otherwise specified.    FINDINGS:   HEAD CTA:  ANTERIOR CIRCULATION: No stenosis/occlusion, aneurysm, or high flow vascular malformation. Standard [place] anatomy.    POSTERIOR CIRCULATION: No stenosis/occlusion, aneurysm, or high flow vascular malformation. Dominant right and smaller left vertebral artery contribute to a normal basilar artery.     DURAL VENOUS SINUSES: Expected enhancement of the major dural venous sinuses.    NECK CTA:  RIGHT CAROTID: Minimal mixed calcified and fibrofatty atherosclerotic plaque at the right carotid bifurcation. No focal plaque ulceration. No measurable stenosis or dissection.    LEFT CAROTID: Minimal calcified atherosclerotic plaque at the carotid bifurcation. No focal plaque ulceration. No measurable stenosis or dissection.    VERTEBRAL ARTERIES: No focal stenosis or dissection. Dominant right and smaller left vertebral arteries.    AORTIC ARCH: Classic aortic arch anatomy with no significant stenosis at the origin of the great vessels.    NONVASCULAR STRUCTURES: Multilevel cervical spondylosis with minimal degenerative stepwise anterolisthesis C3-C5.      Impression    IMPRESSION:   HEAD CTA:   1.  Normal CTA [place].    NECK CTA:  1.  Patent major cervical arteries. No large vessel occlusion or evidence of dissection.  2.  Minimal atherosclerotic plaque at the carotid bifurcations. No significant stenosis.    Findings were discussed with Dr. Angel by myself at 7:42 PM on 12/21/2021.   TSH with free T4 reflex   Result Value Ref Range    TSH 2.20 0.40 - 4.00 mU/L   Ethyl Alcohol Level   Result Value Ref Range    Alcohol ethyl <0.01 <=0.01 g/dL   CRP inflammation   Result Value Ref Range    CRP Inflammation <2.9 0.0 - 8.0 mg/L   [place] Draw    Narrative    The following orders were created for panel order [place] Draw.  Procedure                                Abnormality         Status                     ---------                               -----------         ------                     Extra Blue Top Tube[[id number]]                              Final result                 Please view results for these tests on the individual orders.   Extra Blue Top Tube   Result Value Ref Range    Hold Specimen JIC    [place] Draw    Narrative    The following orders were created for panel order [place] Draw.  Procedure                               Abnormality         Status                     ---------                               -----------         ------                     Extra Purple Top Tube[[id number]]                            Final result                 Please view results for these tests on the individual orders.   Extra Purple Top Tube   Result Value Ref Range    Hold Specimen JIC    UA with Microscopic reflex to Culture    Specimen: Urine, Midstream   Result Value Ref Range    Color Urine Colorless Colorless, Straw, Light Yellow, Yellow    Appearance Urine Clear Clear    Glucose Urine Negative Negative mg/dL    Bilirubin Urine Negative Negative    Ketones Urine Negative Negative mg/dL    Specific Gravity Urine 1.027 1.003 - 1.035    Blood Urine Negative Negative    pH Urine 7.0 5.0 - 7.0    Protein Albumin Urine Negative Negative mg/dL    Urobilinogen Urine Normal Normal, 2.0 mg/dL    Nitrite Urine Negative Negative    Leukocyte Esterase Urine Negative Negative    RBC Urine <1 <=2 /HPF    WBC Urine 0 <=5 /HPF    Squamous Epithelials Urine <1 <=1 /HPF    Narrative    Urine Culture not indicated   MR Brain w/o & w Contrast    Narrative    EXAM: MR BRAIN W/O and W CONTRAST  LOCATION: [facility]  DATE/TIME: 12/21/2021 8:03 PM    INDICATION: Weakness. Dizziness.  COMPARISON: CTA of the head and neck dated 12/21/2021  CONTRAST: Gadavist 9 mL  TECHNIQUE: Routine multiplanar multisequence head MRI without and with intravenous  contrast.    FINDINGS:  INTRACRANIAL CONTENTS: Chronic bilateral basal ganglia and thalamic hemorrhagic lacunar infarcts without acute or subacute infarct. No mass, acute hemorrhage, or extra-axial fluid collections. Patchy and confluent nonspecific T2/FLAIR hyperintensities   within the cerebral white matter and betty most consistent with moderate chronic microvascular ischemic change. Moderate generalized cerebral volume loss without hydrocephalus. Normal position of the cerebellar tonsils. No pathologic contrast enhancement.    SELLA: No abnormality accounting for technique.    OSSEOUS STRUCTURES/SOFT TISSUES: Normal marrow signal. The major intracranial vascular flow voids are maintained.     ORBITS: Prior bilateral cataract surgery. Visualized portions of the orbits are otherwise unremarkable.     SINUSES/MASTOIDS: No paranasal sinus mucosal disease. No middle ear or mastoid effusion.       Impression    IMPRESSION:    1.  Moderate senescent intracranial changes and sequelae of moderate chronic microangiopathy without acute intracranial abnormality       Medications   Sodium Chloride 0.9 % bag 100mL for CT scan (100 mLs Intravenous Given 12/21/21 1848)   iopamidol (ISOVUE-370) solution 500 mL (80 mLs Intravenous Given 12/21/21 1849)   aspirin (ASA) tablet 325 mg (325 mg Oral Given 12/21/21 1939)   amLODIPine (NORVASC) tablet 5 mg (5 mg Oral Given 12/21/21 1939)   LORazepam (ATIVAN) injection 0.75 mg (0.75 mg Intravenous Given 12/21/21 1944)   gadobutrol (GADAVIST) injection 10 mL (9 mLs Intravenous Given 12/21/21 2021)       Assessments & Plan (with Medical Decision Making)  66-year-old female to emergency room secondary concerns of difficulty expressing her words difficulty speaking starting approximately 1 hour ago. Patient denies any fever, injury, fall, headache, vision changes, decreased muscle strength, stomach upset, diarrhea, nausea or vomiting symptoms. Patient symptoms started to resolve on arrival to  the exam room. She was able to talk but had some episodes of expressive aphasia. Stroke code was de-escalation secondary to improving symptoms with NIH score initially of one. CT scan and CTA without findings for acute CVA. MRI scan was also performed without findings of acute CVA. Patient's initial blood pressure was quite elevated. Patient was given oral aspirin therapy along with a dose of amlodipine. Patient also received a dose of diazepam to assist you with the MRI scan which also seem to help with her significant anxiety. Patient symptoms gradually resolved during the ER stay. She was able to ambulate to the bathroom back without difficulty. Speech was markedly improved along with her word finding ability. Discussed possibility of a stroke, versus possible symptoms associated with her significant hypertension. Patient desired return to home as they are planning a trip back to [place] in the morning. We discussed continuing her current blood pressure medications but will add amlodipine to her current meds. Discussed continued use of aspirin therapy. Discussed follow-up with her clinic physician on arrival back to [place] as soon as possible for recheck. To return the ER for any increase or worsening symptoms as directed on the handout that I sent home with her today. Patient discharged to care of her family.     I have reviewed the nursing notes.    I have reviewed the findings, diagnosis, plan and need for follow up with the patient.     Final diagnoses:   Malignant essential hypertension   Expressive aphasia - improved       12/21/2021   [facility] EMERGENCY DEPT     Sandro Angel, DO  12/22/21 1337

## 2021-12-28 ENCOUNTER — TELEPHONE (OUTPATIENT)
Dept: FAMILY MEDICINE | Facility: CLINIC | Age: 66
End: 2021-12-28
Payer: COMMERCIAL

## 2021-12-28 NOTE — TELEPHONE ENCOUNTER
Patient calling. She was in [place] last week and was seen in the ED for stroke like symptoms. Stroke was ruled out and hypertension was the thought behind her symptoms. Patient said they started her on Amlodipine and discharged her.     Patient said she knew she was on Amlodipine before and couldn't remember why she stopped it. Patient said she started to develop tingling in her face and arms. She then recalled that is the symptoms she had previous. Patient said she went ahead and stopped the Amlodipine.     Patient calling wondering what to do from here. She lives in [place] and is now back home in [place]. RN reviewed ED note. It was advised that she needs to see a provider once she returns to [place]. Patient states she has not seen anyone since returning home. She said she stopped the Amlodipine and all he symptoms went away. She said her BP has been back to her normal (which is still too high, 160-164 for systolic).     Patient wants to know what to do and if she is OK that she stopped the Amlodipine. RN advised patient she needs to schedule an appointment with a provider in her area. Advised our providers are only licensed to treat in [place] and she really needs an in person follow up. Patient said she will contact her local clinic for an appointment.     SUNSHINE Martell, RN  [facility]

## 2022-06-04 ENCOUNTER — HEALTH MAINTENANCE LETTER (OUTPATIENT)
Age: 67
End: 2022-06-04

## 2022-10-09 ENCOUNTER — HEALTH MAINTENANCE LETTER (OUTPATIENT)
Age: 67
End: 2022-10-09

## 2023-05-30 ENCOUNTER — TELEPHONE (OUTPATIENT)
Dept: SLEEP MEDICINE | Facility: CLINIC | Age: 68
End: 2023-05-30

## 2023-05-30 NOTE — TELEPHONE ENCOUNTER
Reason for Call:  Appointment Request    Patient requesting this type of appt:  Sleep Return    Requested provider: Dr Mann    Reason patient unable to be scheduled: Not within requested timeframe    When does patient want to be seen/preferred time: Sooner than October    Comments: Julia and her  Bunny are hoping to see Dr Mann before the next available appointment in October because they are out of supplies, and leaving for [place] before that date.  Please call her back.      Could we send this information to you in [employer] or would you prefer to receive a phone call?:   Patient would prefer a phone call   Okay to leave a detailed message?: Yes at Cell number on file:    Telephone Information:   Mobile [phone]       Call taken on 5/30/2023 at 3:32 PM by Jocelynn Pires

## 2023-08-04 ENCOUNTER — TELEPHONE (OUTPATIENT)
Dept: SLEEP MEDICINE | Facility: CLINIC | Age: 68
End: 2023-08-04
Payer: COMMERCIAL

## 2023-08-04 DIAGNOSIS — G47.33 OBSTRUCTIVE SLEEP APNEA: Primary | ICD-10-CM

## 2023-08-04 NOTE — TELEPHONE ENCOUNTER
General Call      Reason for Call:  Pt is advising that she cannot wait until upcoming appt scheduled for 9.19.23 for supplies. PT is stating that she needs a new tank and masks    What are your questions or concerns:  see above    Date of last appointment with provider: upcoming appt 9.19.23    Could we send this information to you in [facility] or would you prefer to receive a phone call?:   Patient would prefer a phone call   Okay to leave a detailed message?: Yes at Home number on file [phone] (home)

## 2023-08-04 NOTE — TELEPHONE ENCOUNTER
Last ov 10/3/2019  Next ov 9/19/23    Patient requesting updated order for CPAP supplies prior to appointment. Order pended and routed to provider for consideration.    Darby FAN RN  [facility]

## 2023-08-10 NOTE — TELEPHONE ENCOUNTER
Order/Referral Request    Who is requesting: Patient calling again. She said she has not received a call back yet regarding her request for a tank and C-pap supplies. Can someone ask Dr. May regarding ordering a new tank and supplies for patient?  She said she has a leak in her tank. Thank you    Orders being requested: See previous notes.     Reason service is needed/diagnosis:N/A patient has an appointment, but needs the supplies ASAP    When are orders needed by:ASAP    Has this been discussed with Provider: No    Does patient have a preference on a Group/Provider/Facility? N/A    Does patient have an appointment scheduled?: Yes:    Where to send orders: Ask Patient. She lives in [place].    Could we send this information to you in [facility] or would you prefer to receive a phone call?:   Patient would prefer a phone call   Okay to leave a detailed message?: Yes at Cell number on file:    Telephone Information:   Mobile [phone]

## 2023-09-05 NOTE — TELEPHONE ENCOUNTER
Spoke with patient. She uses [employer]. Let patient know order was sent to [facility] and phone number provided.    Darby FAN RN  [facility]

## 2023-09-19 ENCOUNTER — OFFICE VISIT (OUTPATIENT)
Dept: SLEEP MEDICINE | Facility: CLINIC | Age: 68
End: 2023-09-19
Payer: COMMERCIAL

## 2023-09-19 VITALS
HEIGHT: 64 IN | DIASTOLIC BLOOD PRESSURE: 97 MMHG | WEIGHT: 190 LBS | SYSTOLIC BLOOD PRESSURE: 171 MMHG | HEART RATE: 66 BPM | OXYGEN SATURATION: 98 % | BODY MASS INDEX: 32.44 KG/M2

## 2023-09-19 DIAGNOSIS — G47.33 OSA (OBSTRUCTIVE SLEEP APNEA): Primary | ICD-10-CM

## 2023-09-19 PROCEDURE — 99203 OFFICE O/P NEW LOW 30 MIN: CPT | Performed by: INTERNAL MEDICINE

## 2023-09-19 RX ORDER — METOPROLOL TARTRATE 25 MG/1
25 TABLET, FILM COATED ORAL 2 TIMES DAILY
COMMUNITY

## 2023-09-19 RX ORDER — HYDRALAZINE HYDROCHLORIDE 25 MG/1
25 TABLET, FILM COATED ORAL 3 TIMES DAILY
COMMUNITY

## 2023-09-19 ASSESSMENT — SLEEP AND FATIGUE QUESTIONNAIRES
HOW LIKELY ARE YOU TO NOD OFF OR FALL ASLEEP WHILE SITTING QUIETLY AFTER LUNCH WITHOUT ALCOHOL: SLIGHT CHANCE OF DOZING
HOW LIKELY ARE YOU TO NOD OFF OR FALL ASLEEP IN A CAR, WHILE STOPPED FOR A FEW MINUTES IN TRAFFIC: SLIGHT CHANCE OF DOZING
HOW LIKELY ARE YOU TO NOD OFF OR FALL ASLEEP WHILE SITTING INACTIVE IN A PUBLIC PLACE: SLIGHT CHANCE OF DOZING
HOW LIKELY ARE YOU TO NOD OFF OR FALL ASLEEP WHEN YOU ARE A PASSENGER IN A CAR FOR AN HOUR WITHOUT A BREAK: MODERATE CHANCE OF DOZING
HOW LIKELY ARE YOU TO NOD OFF OR FALL ASLEEP WHILE WATCHING TV: MODERATE CHANCE OF DOZING
HOW LIKELY ARE YOU TO NOD OFF OR FALL ASLEEP WHILE LYING DOWN TO REST IN THE AFTERNOON WHEN CIRCUMSTANCES PERMIT: SLIGHT CHANCE OF DOZING
HOW LIKELY ARE YOU TO NOD OFF OR FALL ASLEEP WHILE SITTING AND TALKING TO SOMEONE: SLIGHT CHANCE OF DOZING
HOW LIKELY ARE YOU TO NOD OFF OR FALL ASLEEP WHILE SITTING AND READING: MODERATE CHANCE OF DOZING

## 2023-09-19 NOTE — PROGRESS NOTES
Obstructive Sleep Apnea - PAP Follow-Up Visit:    Chief Complaint   Patient presents with    CPAP Follow Up     CPAP follow up        Julia Jacobson comes in today for follow-up of their severe sleep apnea, managed with CPAP.     Split-night PSG on 7/12/2016 at a weight of 179 pounds showed an apnea-hypopnea index of 62.5/h.  The REM AHI was 72/h.  Supine AHI was 97.5/h.  Lowest oxygen saturation was 89.4%.  CPAP titration at a pressure of 4 cm H2O was effective.    Do you use a CPAP Machine at home: Yes  Overall, on a scale of 0-10 how would you rate your CPAP (0 poor, 10 great): 10  Is your mask comfortable: Yes  If not, why:    Is you mask leaking: No  If yes, where do you feel it:    How many night per week does the mask leak (0-7):    Do you notice snoring with mask on: No  Do you notice gasping arousals with mask on: No  Are you having significant oral or nasal dryness: No  Is the pressure setting comfortable: No  In not, why: air comes out to fast  What type of mask do you use: Nasal Mask  What is your typical bedtime: between 930 and 10pm  How long does it take you to go to sleep on PAP therapy: 5or 10minutes  What time do you typically get out of bed for the day: between 730am and 9am  How many hours on average per night are you using PAP therapy: 7 or 8hours  How many hours are you sleeping per night: same as above  Do you feel well rested in the morning: Yes    ResMed     Auto-PAP 4-8 cmH2O download:  90/90 total days of use. 0 nonuse days. 100% days with >4 hours use.  Average use 9 hours 0 minutes per day. Median Leak 0.3 L/min. 95%ile Leak 0.4 L/min. CPAP 95% pressure 7.9 cm. AHI 3.7    EPWORTH SLEEPINESS SCALE         9/19/2023     1:15 PM    [place] Sleepiness Scale ( [place]  [id number]<br>ESS - [place]/English - Final version - 21 Nov 07 - [facility] [facility] [place].)   Sitting and reading Moderate chance of dozing   Watching TV Moderate chance of dozing   Sitting, inactive in a public place (e.g. a  "theatre or a meeting) Slight chance of dozing   As a passenger in a car for an hour without a break Moderate chance of dozing   Lying down to rest in the afternoon when circumstances permit Slight chance of dozing   Sitting and talking to someone Slight chance of dozing   Sitting quietly after a lunch without alcohol Slight chance of dozing   In a car, while stopped for a few minutes in traffic Slight chance of dozing   Geraldine Score (MC) 11   Geraldine Score (Sleep) 11       INSOMNIA SEVERITY INDEX (SHARON)          9/19/2023     1:06 PM   Insomnia Severity Index (SHARON)   Difficulty falling asleep 2   Difficulty staying asleep 1   Problems waking up too early 0   How SATISFIED/DISSATISFIED are you with your CURRENT sleep pattern? 1   How NOTICEABLE to others do you think your sleep problem is in terms of impairing the quality of your life? 0   How WORRIED/DISTRESSED are you about your current sleep problem? 0   To what extent do you consider your sleep problem to INTERFERE with your daily functioning (e.g. daytime fatigue, mood, ability to function at work/daily chores, concentration, memory, mood, etc.) CURRENTLY? 0   SHARON Total Score 4       Guidelines for Scoring/Interpretation:  Total score categories:  0-7 = No clinically significant insomnia   8-14 = Subthreshold insomnia   15-21 = Clinical insomnia (moderate severity)  22-28 = Clinical insomnia (severe)  Used via courtesy of www.Apajaealth.va.gov with permission from Osmani Chavez PhD., [facility]    BP (!) 171/97   Pulse 66   Ht 1.613 m (5' 3.5\")   Wt 86.2 kg (190 lb)   SpO2 98%   BMI 33.12 kg/m      Impression/Plan:     Severe obstructive sleep apnea.     - Patient is using CPAP regularly and continues to benefit from therapy.  I reviewed download data and graphs from her device for the last 90 days.  Regular compliance and normal residual AHI is demonstrated, confirming adequate treatment of sleep apnea.    Plan:     Continue auto titrating CPAP " therapy with a pressure range of 4 to 8 cm H2O    Julia Jacobson will follow up in about 1 year(s).       Ulysses May MD    CC:  Maximino Salinas,

## 2023-09-19 NOTE — PATIENT INSTRUCTIONS
Your There is no height or weight on file to calculate BMI.  Weight management is a personal decision.  If you are interested in exploring weight loss strategies, the following discussion covers the approaches that may be successful. Body mass index (BMI) is one way to tell whether you are at a healthy weight, overweight, or obese. It measures your weight in relation to your height.  A BMI of 18.5 to 24.9 is in the healthy range. A person with a BMI of 25 to 29.9 is considered overweight, and someone with a BMI of 30 or greater is considered obese. More than two-thirds of American adults are considered overweight or obese.  Being overweight or obese increases the risk for further weight gain. Excess weight may lead to heart disease and diabetes.  Creating and following plans for healthy eating and physical activity may help you improve your health.  Weight control is part of healthy lifestyle and includes exercise, emotional health, and healthy eating habits. Careful eating habits lifelong are the mainstay of weight control. Though there are significant health benefits from weight loss, long-term weight loss with diet alone may be very difficult to achieve- studies show long-term success with dietary management in less than 10% of people. Attaining a healthy weight may be especially difficult to achieve in those with severe obesity. In some cases, medications, devices and surgical management might be considered.  What can you do?  If you are overweight or obese and are interested in methods for weight loss, you should discuss this with your provider.   Consider reducing daily calorie intake by 500 calories.   Keep a food journal.   Avoiding skipping meals, consider cutting portions instead.    Diet combined with exercise helps maintain muscle while optimizing fat loss. Strength training is particularly important for building and maintaining muscle mass. Exercise helps reduce stress, increase energy, and improves  fitness. Increasing exercise without diet control, however, may not burn enough calories to loose weight.     Start walking three days a week 10-20 minutes at a time  Work towards walking thirty minutes five days a week   Eventually, increase the speed of your walking for 1-2 minutes at time    In addition, we recommend that you review healthy lifestyles and methods for weight loss available through the National Institutes of Health patient information sites:  http://win.niddk.nih.gov/publications/index.htm    And look into health and wellness programs that may be available through your health insurance provider, employer, local community center, or sheela club.

## 2023-09-19 NOTE — NURSING NOTE
"Chief Complaint   Patient presents with    CPAP Follow Up     CPAP follow up        Initial BP (!) 177/86   Pulse 66   Ht 1.613 m (5' 3.5\")   Wt 86.2 kg (190 lb)   SpO2 98%   BMI 33.12 kg/m   Estimated body mass index is 33.12 kg/m  as calculated from the following:    Height as of this encounter: 1.613 m (5' 3.5\").    Weight as of this encounter: 86.2 kg (190 lb).    Medication Reconciliation: complete  ESS 11  Anneliese Lopez MA     "

## 2024-02-29 NOTE — NURSING NOTE
"Chief Complaint   Patient presents with     Physical       Initial /88 (BP Location: Left arm, Patient Position: Chair, Cuff Size: Adult Regular)  Pulse 89  Temp 98.8  F (37.1  C) (Temporal)  Resp 20  Ht 5' 2.99\" (1.6 m)  Wt 183 lb 6.4 oz (83.2 kg)  SpO2 98%  BMI 32.5 kg/m2 Estimated body mass index is 32.5 kg/(m^2) as calculated from the following:    Height as of this encounter: 5' 2.99\" (1.6 m).    Weight as of this encounter: 183 lb 6.4 oz (83.2 kg).  Medication Reconciliation: complete   Janet Fletcher CMA     "
Rollator

## 2024-10-12 ENCOUNTER — HEALTH MAINTENANCE LETTER (OUTPATIENT)
Age: 69
End: 2024-10-12